# Patient Record
Sex: FEMALE | Race: OTHER | HISPANIC OR LATINO
[De-identification: names, ages, dates, MRNs, and addresses within clinical notes are randomized per-mention and may not be internally consistent; named-entity substitution may affect disease eponyms.]

---

## 2018-02-01 ENCOUNTER — TRANSCRIPTION ENCOUNTER (OUTPATIENT)
Age: 33
End: 2018-02-01

## 2018-05-08 ENCOUNTER — OUTPATIENT (OUTPATIENT)
Dept: OUTPATIENT SERVICES | Facility: HOSPITAL | Age: 33
LOS: 1 days | Discharge: HOME | End: 2018-05-08

## 2018-05-08 DIAGNOSIS — N89.8 OTHER SPECIFIED NONINFLAMMATORY DISORDERS OF VAGINA: ICD-10-CM

## 2018-05-28 ENCOUNTER — INPATIENT (INPATIENT)
Facility: HOSPITAL | Age: 33
LOS: 1 days | Discharge: HOME | End: 2018-05-30
Attending: OBSTETRICS & GYNECOLOGY | Admitting: OBSTETRICS & GYNECOLOGY

## 2018-05-28 VITALS — DIASTOLIC BLOOD PRESSURE: 77 MMHG | SYSTOLIC BLOOD PRESSURE: 139 MMHG | HEART RATE: 71 BPM

## 2018-05-28 LAB
AMPHET UR-MCNC: NEGATIVE — SIGNIFICANT CHANGE UP
APPEARANCE UR: (no result)
BACTERIA # UR AUTO: (no result) /HPF
BARBITURATES UR SCN-MCNC: NEGATIVE — SIGNIFICANT CHANGE UP
BASOPHILS # BLD AUTO: 0.07 K/UL — SIGNIFICANT CHANGE UP (ref 0–0.2)
BASOPHILS NFR BLD AUTO: 0.4 % — SIGNIFICANT CHANGE UP (ref 0–1)
BENZODIAZ UR-MCNC: NEGATIVE — SIGNIFICANT CHANGE UP
BILIRUB UR-MCNC: NEGATIVE — SIGNIFICANT CHANGE UP
BLD GP AB SCN SERPL QL: SIGNIFICANT CHANGE UP
COCAINE METAB.OTHER UR-MCNC: NEGATIVE — SIGNIFICANT CHANGE UP
COLOR SPEC: SIGNIFICANT CHANGE UP
DIFF PNL FLD: (no result)
EOSINOPHIL # BLD AUTO: 0.01 K/UL — SIGNIFICANT CHANGE UP (ref 0–0.7)
EOSINOPHIL NFR BLD AUTO: 0.1 % — SIGNIFICANT CHANGE UP (ref 0–8)
EPI CELLS # UR: (no result) /HPF
GLUCOSE UR QL: NEGATIVE MG/DL — SIGNIFICANT CHANGE UP
GRAN CASTS # UR COMP ASSIST: (no result) /LPF
HCT VFR BLD CALC: 39.4 % — SIGNIFICANT CHANGE UP (ref 37–47)
HGB BLD-MCNC: 14 G/DL — SIGNIFICANT CHANGE UP (ref 12–16)
IMM GRANULOCYTES NFR BLD AUTO: 2 % — HIGH (ref 0.1–0.3)
KETONES UR-MCNC: NEGATIVE — SIGNIFICANT CHANGE UP
LEUKOCYTE ESTERASE UR-ACNC: (no result)
LYMPHOCYTES # BLD AUTO: 1.63 K/UL — SIGNIFICANT CHANGE UP (ref 1.2–3.4)
LYMPHOCYTES # BLD AUTO: 9.2 % — LOW (ref 20.5–51.1)
MCHC RBC-ENTMCNC: 31.2 PG — HIGH (ref 27–31)
MCHC RBC-ENTMCNC: 35.5 G/DL — SIGNIFICANT CHANGE UP (ref 32–37)
MCV RBC AUTO: 87.8 FL — SIGNIFICANT CHANGE UP (ref 81–99)
METHADONE UR-MCNC: NEGATIVE — SIGNIFICANT CHANGE UP
MONOCYTES # BLD AUTO: 0.77 K/UL — HIGH (ref 0.1–0.6)
MONOCYTES NFR BLD AUTO: 4.3 % — SIGNIFICANT CHANGE UP (ref 1.7–9.3)
NEUTROPHILS # BLD AUTO: 14.95 K/UL — HIGH (ref 1.4–6.5)
NEUTROPHILS NFR BLD AUTO: 84 % — HIGH (ref 42.2–75.2)
NITRITE UR-MCNC: NEGATIVE — SIGNIFICANT CHANGE UP
NRBC # BLD: 0 /100 WBCS — SIGNIFICANT CHANGE UP (ref 0–0)
OPIATES UR-MCNC: NEGATIVE — SIGNIFICANT CHANGE UP
PCP SPEC-MCNC: SIGNIFICANT CHANGE UP
PH UR: 6 — SIGNIFICANT CHANGE UP (ref 5–8)
PLATELET # BLD AUTO: 253 K/UL — SIGNIFICANT CHANGE UP (ref 130–400)
PRENATAL SYPHILIS TEST: SIGNIFICANT CHANGE UP
PROPOXYPHENE QUALITATIVE URINE RESULT: NEGATIVE — SIGNIFICANT CHANGE UP
PROT UR-MCNC: 100 MG/DL
RBC # BLD: 4.49 M/UL — SIGNIFICANT CHANGE UP (ref 4.2–5.4)
RBC # FLD: 13 % — SIGNIFICANT CHANGE UP (ref 11.5–14.5)
RBC CASTS # UR COMP ASSIST: (no result) /HPF
SP GR SPEC: 1.02 — SIGNIFICANT CHANGE UP (ref 1.01–1.03)
TYPE + AB SCN PNL BLD: SIGNIFICANT CHANGE UP
UROBILINOGEN FLD QL: 0.2 MG/DL — SIGNIFICANT CHANGE UP (ref 0.2–0.2)
WBC # BLD: 17.79 K/UL — HIGH (ref 4.8–10.8)
WBC # FLD AUTO: 17.79 K/UL — HIGH (ref 4.8–10.8)
WBC UR QL: (no result) /HPF

## 2018-05-28 RX ORDER — ONDANSETRON 8 MG/1
4 TABLET, FILM COATED ORAL EVERY 6 HOURS
Qty: 0 | Refills: 0 | Status: DISCONTINUED | OUTPATIENT
Start: 2018-05-28 | End: 2018-05-30

## 2018-05-28 RX ORDER — MAGNESIUM HYDROXIDE 400 MG/1
30 TABLET, CHEWABLE ORAL
Qty: 0 | Refills: 0 | Status: DISCONTINUED | OUTPATIENT
Start: 2018-05-28 | End: 2018-05-30

## 2018-05-28 RX ORDER — SODIUM CHLORIDE 9 MG/ML
1000 INJECTION, SOLUTION INTRAVENOUS ONCE
Qty: 0 | Refills: 0 | Status: DISCONTINUED | OUTPATIENT
Start: 2018-05-28 | End: 2018-05-29

## 2018-05-28 RX ORDER — OXYTOCIN 10 UNIT/ML
41.67 VIAL (ML) INJECTION
Qty: 20 | Refills: 0 | Status: DISCONTINUED | OUTPATIENT
Start: 2018-05-28 | End: 2018-05-30

## 2018-05-28 RX ORDER — ACETAMINOPHEN 500 MG
650 TABLET ORAL EVERY 6 HOURS
Qty: 0 | Refills: 0 | Status: DISCONTINUED | OUTPATIENT
Start: 2018-05-28 | End: 2018-05-30

## 2018-05-28 RX ORDER — GLYCERIN ADULT
1 SUPPOSITORY, RECTAL RECTAL AT BEDTIME
Qty: 0 | Refills: 0 | Status: DISCONTINUED | OUTPATIENT
Start: 2018-05-28 | End: 2018-05-30

## 2018-05-28 RX ORDER — DOCUSATE SODIUM 100 MG
100 CAPSULE ORAL
Qty: 0 | Refills: 0 | Status: DISCONTINUED | OUTPATIENT
Start: 2018-05-28 | End: 2018-05-30

## 2018-05-28 RX ORDER — DIBUCAINE 1 %
1 OINTMENT (GRAM) RECTAL EVERY 4 HOURS
Qty: 0 | Refills: 0 | Status: DISCONTINUED | OUTPATIENT
Start: 2018-05-28 | End: 2018-05-30

## 2018-05-28 RX ORDER — OXYCODONE AND ACETAMINOPHEN 5; 325 MG/1; MG/1
1 TABLET ORAL
Qty: 0 | Refills: 0 | Status: DISCONTINUED | OUTPATIENT
Start: 2018-05-28 | End: 2018-05-30

## 2018-05-28 RX ORDER — IBUPROFEN 200 MG
600 TABLET ORAL EVERY 6 HOURS
Qty: 0 | Refills: 0 | Status: DISCONTINUED | OUTPATIENT
Start: 2018-05-28 | End: 2018-05-30

## 2018-05-28 RX ORDER — AER TRAVELER 0.5 G/1
1 SOLUTION RECTAL; TOPICAL EVERY 4 HOURS
Qty: 0 | Refills: 0 | Status: DISCONTINUED | OUTPATIENT
Start: 2018-05-28 | End: 2018-05-30

## 2018-05-28 RX ORDER — TETANUS TOXOID, REDUCED DIPHTHERIA TOXOID AND ACELLULAR PERTUSSIS VACCINE, ADSORBED 5; 2.5; 8; 8; 2.5 [IU]/.5ML; [IU]/.5ML; UG/.5ML; UG/.5ML; UG/.5ML
0.5 SUSPENSION INTRAMUSCULAR ONCE
Qty: 0 | Refills: 0 | Status: DISCONTINUED | OUTPATIENT
Start: 2018-05-28 | End: 2018-05-30

## 2018-05-28 RX ORDER — NALOXONE HYDROCHLORIDE 4 MG/.1ML
0.1 SPRAY NASAL
Qty: 0 | Refills: 0 | Status: DISCONTINUED | OUTPATIENT
Start: 2018-05-28 | End: 2018-05-30

## 2018-05-28 RX ORDER — SODIUM CHLORIDE 9 MG/ML
1000 INJECTION, SOLUTION INTRAVENOUS
Qty: 0 | Refills: 0 | Status: DISCONTINUED | OUTPATIENT
Start: 2018-05-28 | End: 2018-05-29

## 2018-05-28 RX ORDER — LANOLIN
1 OINTMENT (GRAM) TOPICAL EVERY 6 HOURS
Qty: 0 | Refills: 0 | Status: DISCONTINUED | OUTPATIENT
Start: 2018-05-28 | End: 2018-05-30

## 2018-05-28 RX ORDER — SIMETHICONE 80 MG/1
80 TABLET, CHEWABLE ORAL EVERY 6 HOURS
Qty: 0 | Refills: 0 | Status: DISCONTINUED | OUTPATIENT
Start: 2018-05-28 | End: 2018-05-30

## 2018-05-28 RX ORDER — OXYTOCIN 10 UNIT/ML
125 VIAL (ML) INJECTION
Qty: 20 | Refills: 0 | Status: DISCONTINUED | OUTPATIENT
Start: 2018-05-28 | End: 2018-05-29

## 2018-05-28 RX ORDER — DIPHENHYDRAMINE HCL 50 MG
25 CAPSULE ORAL EVERY 6 HOURS
Qty: 0 | Refills: 0 | Status: DISCONTINUED | OUTPATIENT
Start: 2018-05-28 | End: 2018-05-30

## 2018-05-28 RX ADMIN — Medication 600 MILLIGRAM(S): at 23:33

## 2018-05-28 NOTE — OB PROVIDER H&P - HISTORY OF PRESENT ILLNESS
34yo  at 39w4d GA, by LMP c/w 1st trim sono, c/o ctx since 0500, currently q2-3min, 7/10 intensity. C/o LOF since early this morning, unsure of time, pinkish tinged, clear. Denies harlan VB. Good fetal movements. No complications during this pregnancy.

## 2018-05-28 NOTE — OB PROVIDER DELIVERY SUMMARY - NSPROVIDERDELIVERYNOTE_OBGYN_ALL_OB_FT
Pt became fully dilated and pushed well over intact perineum, delivery of head in direct OP position, nose and mouth bulb suctioned on perineum, can x 1 loose reduced, followed by delivery of shoulders and body without difficulty, live male infant, APGARS 9/9, 3150gms, 3-vessel cord + placenta complete, 2nd degree perineal laceration repaired with chromic, no complications, Viacord + collected

## 2018-05-28 NOTE — PROCEDURE NOTE - GENERAL PROCEDURE DETAILS
Epidural catheter placed easily and aseptically via RE technique, NO CSF, heme, or paresthesiae noted.  Catheter secured, sterile dressing applied.  Pt. placed supine with L.U.D.

## 2018-05-28 NOTE — OB PROVIDER H&P - NSHPPHYSICALEXAM_GEN_ALL_CORE
Vital Signs Last 24 Hrs  T(C): 36.8 (28 May 2018 09:26), Max: 37.1 (28 May 2018 09:08)  T(F): 98.2 (28 May 2018 09:26), Max: 98.7 (28 May 2018 09:08)  HR: 70 (28 May 2018 09:26) (70 - 71)  BP: 105/66 (28 May 2018 09:26) (105/66 - 139/77)  RR: 18 (28 May 2018 09:26) (18 - 20)   EFM: 130/mod/accels+  [place]: q1-3min   SVE: 5-6/80/-2, vtx, forewaters present   Speculum: pooling pos, nitrazine pos, ferning pos

## 2018-05-28 NOTE — OB PROVIDER H&P - NSNYCREQUIREMENTS_OBGYN_ALL_OB
ADD [facility] REQUIREMENTS SECTION (Counts include 234 beds at the [facility])...

## 2018-05-28 NOTE — OB PROVIDER H&P - ASSESSMENT
32yo  at 39w4d GA, by LMP c/w 1st trim sono, GBS neg, in labor  - Admit  - Clear liquids  - IV fluids  - Admission labs  - Cont toco and efm  - Pain mgt prn      Dr. Melo and Dr. Cooper aware

## 2018-05-28 NOTE — OB PROVIDER H&P - NSHPLABSRESULTS_GEN_ALL_CORE
ega- 29.5w- efw 1342g (43%) cephalic, 3VC, anterior placenta, MVP 5.5cm   ega- 19.4w- efw 298g, transverse placenta, 3vc, anterior placenta, normal anatomy  GCT 97

## 2018-05-28 NOTE — PROGRESS NOTE ADULT - SUBJECTIVE AND OBJECTIVE BOX
PGY2 Note    Patient seen at bedside for evaluation of labor progression, doing well, no complaints.     Vital Signs Last 24 Hrs  T(C): 37.0 (28 May 2018 15:30), Max: 37.1 (28 May 2018 09:08)  T(F): 98.6 (28 May 2018 15:30), Max: 98.7 (28 May 2018 09:08)  HR: 65 (28 May 2018 16:37) (55 - 103)  BP: 108/57 (28 May 2018 16:37) (80/47 - 139/77)  BP(mean): --  RR: 18 (28 May 2018 16:06) (18 - 20)  SpO2: --  EFM: 135/mod/+accels, one variable deceleration spontaneous with return to baseline. ISE in place since 1206.  TOCO: Q4-5min  SVE: Deferred, last VE 10100/0 at 1549  AROM clear at 1150    Medications:  1006 Epidural given  1113 Ephedrine    Labs:                        14.0   17.79 )-----------( 253      ( 28 May 2018 10:01 )             39.4         Urinalysis Basic - ( 28 May 2018 15:25 )    Color: Dark Yellow / Appearance: Cloudy / S.025 / pH: x  Gluc: x / Ketone: Negative  / Bili: Negative / Urobili: 0.2 mg/dL   Blood: x / Protein: 100 mg/dL / Nitrite: Negative   Leuk Esterase: Large / RBC: 25-50 /HPF / WBC 26-50 /HPF   Sq Epi: x / Non Sq Epi: Few /HPF / Bacteria: Few /HPF      Prenatal Syphilis Test: Nonreact (18 @ 10:01)    Type + Screen (18 @ 09:58)    Type + Screen: A POS    UDS pending

## 2018-05-28 NOTE — PROGRESS NOTE ADULT - SUBJECTIVE AND OBJECTIVE BOX
PGY3 Antepartum Note    Patient seen and examined at bedside in NAD. Denies any complaints at this time, reports pain well controlled with epidural.     T(F): 98.2 (09:26)  HR: 77 (14:07)  BP: 91/53 (14:07)  RR: 18 (12:36)  EFM: 135, mod.clara, + accels  TOCO: q 5-6 min  SVE: deferred, last exam 9100/0    Medications:  s/p epidural  IVF      Labs:                        14.0   17.79 )-----------( 253      ( 28 May 2018 10:01 )             39.4         A/P:   34yo  at 39wk5d GA, GBS negative, in labor  - c/w current management  - continuous toco/EFM  - pain management PRN    Dr. Cooper aware

## 2018-05-28 NOTE — PROGRESS NOTE ADULT - ASSESSMENT
A/P:   33y  at 39w5d, GBS negative, in labor with ISE in place, s/p epidural  -pain management prn  -cont efm/toco  -f/u UDS  -cont to monitor vitals  -cont iv hydration  - Anticipate     Dr. Melo and Dr. Cooper aware

## 2018-05-29 LAB
BASOPHILS # BLD AUTO: 0.06 K/UL — SIGNIFICANT CHANGE UP (ref 0–0.2)
BASOPHILS NFR BLD AUTO: 0.3 % — SIGNIFICANT CHANGE UP (ref 0–1)
EOSINOPHIL # BLD AUTO: 0.11 K/UL — SIGNIFICANT CHANGE UP (ref 0–0.7)
EOSINOPHIL NFR BLD AUTO: 0.6 % — SIGNIFICANT CHANGE UP (ref 0–8)
HCT VFR BLD CALC: 32.6 % — LOW (ref 37–47)
HGB BLD-MCNC: 11.1 G/DL — LOW (ref 12–16)
IMM GRANULOCYTES NFR BLD AUTO: 2.3 % — HIGH (ref 0.1–0.3)
LYMPHOCYTES # BLD AUTO: 14.8 % — LOW (ref 20.5–51.1)
LYMPHOCYTES # BLD AUTO: 2.66 K/UL — SIGNIFICANT CHANGE UP (ref 1.2–3.4)
MCHC RBC-ENTMCNC: 31 PG — SIGNIFICANT CHANGE UP (ref 27–31)
MCHC RBC-ENTMCNC: 34 G/DL — SIGNIFICANT CHANGE UP (ref 32–37)
MCV RBC AUTO: 91.1 FL — SIGNIFICANT CHANGE UP (ref 81–99)
MONOCYTES # BLD AUTO: 1.32 K/UL — HIGH (ref 0.1–0.6)
MONOCYTES NFR BLD AUTO: 7.4 % — SIGNIFICANT CHANGE UP (ref 1.7–9.3)
NEUTROPHILS # BLD AUTO: 13.36 K/UL — HIGH (ref 1.4–6.5)
NEUTROPHILS NFR BLD AUTO: 74.6 % — SIGNIFICANT CHANGE UP (ref 42.2–75.2)
NRBC # BLD: 0 /100 WBCS — SIGNIFICANT CHANGE UP (ref 0–0)
PLATELET # BLD AUTO: 238 K/UL — SIGNIFICANT CHANGE UP (ref 130–400)
RBC # BLD: 3.58 M/UL — LOW (ref 4.2–5.4)
RBC # FLD: 13.5 % — SIGNIFICANT CHANGE UP (ref 11.5–14.5)
WBC # BLD: 17.93 K/UL — HIGH (ref 4.8–10.8)
WBC # FLD AUTO: 17.93 K/UL — HIGH (ref 4.8–10.8)

## 2018-05-29 RX ADMIN — Medication 600 MILLIGRAM(S): at 00:16

## 2018-05-29 RX ADMIN — Medication 600 MILLIGRAM(S): at 06:48

## 2018-05-29 RX ADMIN — Medication 1 TABLET(S): at 09:01

## 2018-05-29 RX ADMIN — Medication 600 MILLIGRAM(S): at 20:40

## 2018-05-29 RX ADMIN — Medication 600 MILLIGRAM(S): at 21:45

## 2018-05-29 RX ADMIN — Medication 600 MILLIGRAM(S): at 07:18

## 2018-05-29 NOTE — PROGRESS NOTE ADULT - ASSESSMENT
IMP: s/p  - PPD 1 - Doing Well    Plan:  f/u CBC, NSAID's/PNV's, Continued PP Care, anticipated d/c -

## 2018-05-29 NOTE — PROGRESS NOTE ADULT - SUBJECTIVE AND OBJECTIVE BOX
Pt seen at bedside, no complaints, nursing    Vital Signs Last 24 Hrs  T(C): 35.8 (29 May 2018 07:50), Max: 37.1 (28 May 2018 09:08)  T(F): 96.5 (29 May 2018 07:50), Max: 98.7 (28 May 2018 09:08)  HR: 70 (29 May 2018 07:50) (55 - 130)  BP: 109/58 (29 May 2018 07:50) (63/26 - 162/59)  RR: 18 (29 May 2018 07:50) (18 - 20)    Resp - CTA b/l  CVS - S1S2+, RRR  Breasts - soft, NT  Abd - soft, NTND, BS+, uterus - firm  VE - Moderate lochia, perineum- intact  Ext - No Homans    LABS:                          14.0   17.79 )-----------( 253      ( 28 May 2018 10:01 )             39.4     A POS, Rubella - Immune, RPR - NR

## 2018-05-30 ENCOUNTER — TRANSCRIPTION ENCOUNTER (OUTPATIENT)
Age: 33
End: 2018-05-30

## 2018-05-30 VITALS
RESPIRATION RATE: 20 BRPM | TEMPERATURE: 97 F | HEART RATE: 68 BPM | SYSTOLIC BLOOD PRESSURE: 130 MMHG | DIASTOLIC BLOOD PRESSURE: 66 MMHG

## 2018-05-30 RX ORDER — ACETAMINOPHEN 500 MG
2 TABLET ORAL
Qty: 0 | Refills: 0 | DISCHARGE
Start: 2018-05-30

## 2018-05-30 RX ORDER — IBUPROFEN 200 MG
1 TABLET ORAL
Qty: 0 | Refills: 0 | DISCHARGE
Start: 2018-05-30

## 2018-05-30 NOTE — DISCHARGE NOTE OB - CARE PROVIDER_API CALL
Kevan Cooper), Obstetrics and Gynecology  [address] [place] [address]  Phone: [phone]  Fax: [phone]

## 2018-05-30 NOTE — DISCHARGE NOTE OB - CARE PLAN
Goal:	Healthy mom and baby  Assessment and plan of treatment:	No heavy lifting. Nothing inside the vagina for 6 weeks: no tampons, douching, sexual intercourse, tub baths or pools. If you have a fever over 100.4F, severe abdominal pain or heavy vaginal bleeding please call your doctor or go to the emergency room.

## 2018-05-30 NOTE — DISCHARGE NOTE OB - PLAN OF CARE
Healthy mom and baby No heavy lifting. Nothing inside the vagina for 6 weeks: no tampons, douching, sexual intercourse, tub baths or pools. If you have a fever over 100.4F, severe abdominal pain or heavy vaginal bleeding please call your doctor or go to the emergency room.

## 2018-05-30 NOTE — DISCHARGE NOTE OB - PATIENT PORTAL LINK FT
You can access the [facility] Patient Portal, offered by [facility], by registering with the following website: [email or web address]

## 2018-05-30 NOTE — DISCHARGE NOTE OB - MEDICATION SUMMARY - MEDICATIONS TO TAKE
I will START or STAY ON the medications listed below when I get home from the hospital:    Actamin 325 mg oral tablet  -- 2 tab(s) by mouth every 6 hours, As needed, Mild Pain  -- Indication: For Vaginal Delivery    ibuprofen 600 mg oral tablet  -- 1 tab(s) by mouth every 6 hours, As needed, Moderate Pain  -- Indication: For Vaginal Delivery

## 2018-05-30 NOTE — DISCHARGE NOTE OB - HOSPITAL COURSE
Patient present for labor pain. Uncomplicated delivery and postpartum stay.  Stable to be discharged to home.

## 2018-05-30 NOTE — PROGRESS NOTE ADULT - SUBJECTIVE AND OBJECTIVE BOX
Pt seen at bedside, no complaints, nursing    Vital Signs Last 24 Hrs  T(C): 35.9 (30 May 2018 07:45), Max: 36.4 (29 May 2018 15:10)  T(F): 96.6 (30 May 2018 07:45), Max: 97.6 (29 May 2018 15:10)  HR: 68 (30 May 2018 07:45) (68 - 77)  BP: 130/66 (30 May 2018 07:45) (105/55 - 130/66)  RR: 20 (30 May 2018 07:45) (18 - 20)    Resp - CTA b/l  CVS - S1S2+, RRR  Breasts - soft, NT  Abd - soft, NTND, BS+, uterus - firm  VE - Minimal lochia, perineum- intact  Ext - No Homans                          11.1   17.93 )-----------( 238      ( 29 May 2018 19:07 )             32.6

## 2018-06-01 DIAGNOSIS — Z3A.39 39 WEEKS GESTATION OF PREGNANCY: ICD-10-CM

## 2018-06-01 DIAGNOSIS — Z33.1 PREGNANT STATE, INCIDENTAL: ICD-10-CM

## 2019-02-08 ENCOUNTER — OUTPATIENT (OUTPATIENT)
Dept: OUTPATIENT SERVICES | Facility: HOSPITAL | Age: 34
LOS: 1 days | Discharge: HOME | End: 2019-02-08

## 2019-02-09 DIAGNOSIS — D53.9 NUTRITIONAL ANEMIA, UNSPECIFIED: ICD-10-CM

## 2019-02-09 DIAGNOSIS — E03.9 HYPOTHYROIDISM, UNSPECIFIED: ICD-10-CM

## 2019-02-09 DIAGNOSIS — Z00.00 ENCOUNTER FOR GENERAL ADULT MEDICAL EXAMINATION WITHOUT ABNORMAL FINDINGS: ICD-10-CM

## 2019-02-09 DIAGNOSIS — N39.0 URINARY TRACT INFECTION, SITE NOT SPECIFIED: ICD-10-CM

## 2019-02-09 DIAGNOSIS — E55.9 VITAMIN D DEFICIENCY, UNSPECIFIED: ICD-10-CM

## 2019-02-09 DIAGNOSIS — Z13.1 ENCOUNTER FOR SCREENING FOR DIABETES MELLITUS: ICD-10-CM

## 2019-02-09 DIAGNOSIS — E78.5 HYPERLIPIDEMIA, UNSPECIFIED: ICD-10-CM

## 2019-02-09 DIAGNOSIS — D51.8 OTHER VITAMIN B12 DEFICIENCY ANEMIAS: ICD-10-CM

## 2019-09-26 ENCOUNTER — OUTPATIENT (OUTPATIENT)
Dept: OUTPATIENT SERVICES | Facility: HOSPITAL | Age: 34
LOS: 1 days | Discharge: HOME | End: 2019-09-26

## 2019-09-28 DIAGNOSIS — N89.8 OTHER SPECIFIED NONINFLAMMATORY DISORDERS OF VAGINA: ICD-10-CM

## 2019-10-17 ENCOUNTER — INPATIENT (INPATIENT)
Facility: HOSPITAL | Age: 34
LOS: 2 days | Discharge: HOME | End: 2019-10-20
Attending: OBSTETRICS & GYNECOLOGY | Admitting: OBSTETRICS & GYNECOLOGY

## 2019-10-17 VITALS — HEART RATE: 87 BPM | SYSTOLIC BLOOD PRESSURE: 111 MMHG | DIASTOLIC BLOOD PRESSURE: 67 MMHG

## 2019-10-17 RX ORDER — AMPICILLIN TRIHYDRATE 250 MG
2 CAPSULE ORAL ONCE
Refills: 0 | Status: COMPLETED | OUTPATIENT
Start: 2019-10-17 | End: 2019-10-17

## 2019-10-17 RX ORDER — OXYTOCIN 10 UNIT/ML
333.33 VIAL (ML) INJECTION
Qty: 20 | Refills: 0 | Status: DISCONTINUED | OUTPATIENT
Start: 2019-10-17 | End: 2019-10-20

## 2019-10-17 RX ORDER — AMPICILLIN TRIHYDRATE 250 MG
1 CAPSULE ORAL EVERY 4 HOURS
Refills: 0 | Status: DISCONTINUED | OUTPATIENT
Start: 2019-10-17 | End: 2019-10-18

## 2019-10-17 RX ORDER — CITRIC ACID/SODIUM CITRATE 300-500 MG
15 SOLUTION, ORAL ORAL EVERY 6 HOURS
Refills: 0 | Status: DISCONTINUED | OUTPATIENT
Start: 2019-10-17 | End: 2019-10-18

## 2019-10-17 RX ORDER — INFLUENZA VIRUS VACCINE 15; 15; 15; 15 UG/.5ML; UG/.5ML; UG/.5ML; UG/.5ML
0.5 SUSPENSION INTRAMUSCULAR ONCE
Refills: 0 | Status: COMPLETED | OUTPATIENT
Start: 2019-10-17 | End: 2019-10-19

## 2019-10-17 RX ORDER — SODIUM CHLORIDE 9 MG/ML
1000 INJECTION, SOLUTION INTRAVENOUS
Refills: 0 | Status: DISCONTINUED | OUTPATIENT
Start: 2019-10-17 | End: 2019-10-18

## 2019-10-17 RX ADMIN — Medication 216 GRAM(S): at 22:52

## 2019-10-17 NOTE — OB PROVIDER H&P - NS_OBGYNHISTORY_OBGYN_ALL_OB_FT
OB Hx   FT x1, 2su41ul, no complications    GYN Hx  Denies ovarian cysts, fibroids, STIs, abnormal papsmears.

## 2019-10-17 NOTE — OB PROVIDER H&P - ATTENDING COMMENTS
IMP: IUP @ 38.6wks, GBS+, In Labor    Plan: Admit, IV - Abx, Pain Management, Pitocin Induction, Anticipated

## 2019-10-17 NOTE — OB PROVIDER H&P - HISTORY OF PRESENT ILLNESS
33yo  @38w6d, ELIZABETH 10/25/19 by first trimester sonogram at 6 weeks, presents to L&D with contractions that started.... Denies VB, LOF. Reports good FM. Patient has received prenatal care from the beginning of gestation and denies any complications. 35yo  @38w6d, ELIZABETH 10/25/19 by first trimester sonogram at 6 weeks, presents to L&D with contractions that started around 1400, every 5-10 minutes, 8/10 intensity. They were felt every 3-4 minutes two hours later, same intensity. She felt some LOF since the 0800 today, when she arrived in the hospital @2200 she felt a small gush of clear fluid. Denies VB. Reports good FM. Patient has received prenatal care from the beginning of gestation and denies any complications.

## 2019-10-17 NOTE — OB PROVIDER H&P - NSHPPHYSICALEXAM_GEN_ALL_CORE
Vital Signs Last 24 Hrs  T(C): 36.8 (17 Oct 2019 22:57), Max: 36.8 (17 Oct 2019 22:57)  T(F): 98.24 (17 Oct 2019 22:57), Max: 98.24 (17 Oct 2019 22:57)  HR: 87 (17 Oct 2019 22:37) (87 - 87)  BP: 111/67 (17 Oct 2019 22:37) (111/67 - 111/67)  RR: 18 (17 Oct 2019 22:57) (18 - 18) Vital Signs Last 24 Hrs  T(C): 36.8 (17 Oct 2019 22:57), Max: 36.8 (17 Oct 2019 22:57)  T(F): 98.24 (17 Oct 2019 22:57), Max: 98.24 (17 Oct 2019 22:57)  HR: 87 (17 Oct 2019 22:37) (87 - 87)  BP: 111/67 (17 Oct 2019 22:37) (111/67 - 111/67)  RR: 18 (17 Oct 2019 22:57) (18 - 18)    Gen: AAOx3  Abd: soft, nontender, gravid, +palpable contractions  EFM: 150/mod clara/+accels  [place]: q3-4 mins  SVE: 4/70/-2, vtx by Dr. Browne  EFW by Leopold's: 7lb

## 2019-10-17 NOTE — OB PROVIDER H&P - ASSESSMENT
33yo  @38w6d, GBS pos, in early labor    -admit to L&D  -admission labs  -pain management PRN  -continuous EFM/toco  -clear liquids, IV hydration  -monitor vitals    Dr. Temple and Dr. Cooper to be made aware

## 2019-10-17 NOTE — OB PROVIDER H&P - NSHPLABSRESULTS_GEN_ALL_CORE
[place]  @29w4d EFW 1516g (57%), 3VC, cephalic, post placenta MVP 47mm, BPP 8/8  @19w4d EFW 356g, transverse, post placenta, normal fetal anatomy  @12w5d Normal ovaries, CRL 62.1, FHR+    7/16/19:  GCT 93mg/dL    6/12/19   Measles immune    5/16/19  Sequential screen neg

## 2019-10-18 LAB
AMPHET UR-MCNC: NEGATIVE — SIGNIFICANT CHANGE UP
APPEARANCE UR: ABNORMAL
BACTERIA # UR AUTO: ABNORMAL
BARBITURATES UR SCN-MCNC: NEGATIVE — SIGNIFICANT CHANGE UP
BASOPHILS # BLD AUTO: 0 K/UL — SIGNIFICANT CHANGE UP (ref 0–0.2)
BASOPHILS # BLD AUTO: 0.08 K/UL — SIGNIFICANT CHANGE UP (ref 0–0.2)
BASOPHILS NFR BLD AUTO: 0 % — SIGNIFICANT CHANGE UP (ref 0–1)
BASOPHILS NFR BLD AUTO: 0.6 % — SIGNIFICANT CHANGE UP (ref 0–1)
BENZODIAZ UR-MCNC: NEGATIVE — SIGNIFICANT CHANGE UP
BILIRUB UR-MCNC: NEGATIVE — SIGNIFICANT CHANGE UP
BLD GP AB SCN SERPL QL: SIGNIFICANT CHANGE UP
BUPRENORPHINE SCREEN, URINE RESULT: NEGATIVE — SIGNIFICANT CHANGE UP
COCAINE METAB.OTHER UR-MCNC: NEGATIVE — SIGNIFICANT CHANGE UP
COLOR SPEC: SIGNIFICANT CHANGE UP
DIFF PNL FLD: ABNORMAL
EOSINOPHIL # BLD AUTO: 0.26 K/UL — SIGNIFICANT CHANGE UP (ref 0–0.7)
EOSINOPHIL # BLD AUTO: 0.28 K/UL — SIGNIFICANT CHANGE UP (ref 0–0.7)
EOSINOPHIL NFR BLD AUTO: 1.8 % — SIGNIFICANT CHANGE UP (ref 0–8)
EOSINOPHIL NFR BLD AUTO: 1.9 % — SIGNIFICANT CHANGE UP (ref 0–8)
EPI CELLS # UR: 2 /HPF — SIGNIFICANT CHANGE UP (ref 0–5)
FENTANYL UR QL: NEGATIVE — SIGNIFICANT CHANGE UP
GIANT PLATELETS BLD QL SMEAR: PRESENT — SIGNIFICANT CHANGE UP
GLUCOSE UR QL: NEGATIVE — SIGNIFICANT CHANGE UP
HCT VFR BLD CALC: 32.7 % — LOW (ref 37–47)
HCT VFR BLD CALC: 38.8 % — SIGNIFICANT CHANGE UP (ref 37–47)
HGB BLD-MCNC: 10.7 G/DL — LOW (ref 12–16)
HGB BLD-MCNC: 13 G/DL — SIGNIFICANT CHANGE UP (ref 12–16)
HYALINE CASTS # UR AUTO: 4 /LPF — SIGNIFICANT CHANGE UP (ref 0–7)
IMM GRANULOCYTES NFR BLD AUTO: 3 % — HIGH (ref 0.1–0.3)
KETONES UR-MCNC: NEGATIVE — SIGNIFICANT CHANGE UP
L&D DRUG SCREEN, URINE: SIGNIFICANT CHANGE UP
LEUKOCYTE ESTERASE UR-ACNC: ABNORMAL
LYMPHOCYTES # BLD AUTO: 1.93 K/UL — SIGNIFICANT CHANGE UP (ref 1.2–3.4)
LYMPHOCYTES # BLD AUTO: 12.6 % — LOW (ref 20.5–51.1)
LYMPHOCYTES # BLD AUTO: 18 % — LOW (ref 20.5–51.1)
LYMPHOCYTES # BLD AUTO: 2.49 K/UL — SIGNIFICANT CHANGE UP (ref 1.2–3.4)
MANUAL SMEAR VERIFICATION: SIGNIFICANT CHANGE UP
MCHC RBC-ENTMCNC: 30.7 PG — SIGNIFICANT CHANGE UP (ref 27–31)
MCHC RBC-ENTMCNC: 30.8 PG — SIGNIFICANT CHANGE UP (ref 27–31)
MCHC RBC-ENTMCNC: 32.7 G/DL — SIGNIFICANT CHANGE UP (ref 32–37)
MCHC RBC-ENTMCNC: 33.5 G/DL — SIGNIFICANT CHANGE UP (ref 32–37)
MCV RBC AUTO: 91.7 FL — SIGNIFICANT CHANGE UP (ref 81–99)
MCV RBC AUTO: 94.2 FL — SIGNIFICANT CHANGE UP (ref 81–99)
METHADONE UR-MCNC: NEGATIVE — SIGNIFICANT CHANGE UP
MONOCYTES # BLD AUTO: 0.99 K/UL — HIGH (ref 0.1–0.6)
MONOCYTES # BLD AUTO: 1.38 K/UL — HIGH (ref 0.1–0.6)
MONOCYTES NFR BLD AUTO: 7.2 % — SIGNIFICANT CHANGE UP (ref 1.7–9.3)
MONOCYTES NFR BLD AUTO: 9 % — SIGNIFICANT CHANGE UP (ref 1.7–9.3)
MYELOCYTES NFR BLD: 0.9 % — HIGH (ref 0–0)
NEUTROPHILS # BLD AUTO: 11.31 K/UL — HIGH (ref 1.4–6.5)
NEUTROPHILS # BLD AUTO: 9.57 K/UL — HIGH (ref 1.4–6.5)
NEUTROPHILS NFR BLD AUTO: 69.3 % — SIGNIFICANT CHANGE UP (ref 42.2–75.2)
NEUTROPHILS NFR BLD AUTO: 73.9 % — SIGNIFICANT CHANGE UP (ref 42.2–75.2)
NITRITE UR-MCNC: NEGATIVE — SIGNIFICANT CHANGE UP
NRBC # BLD: 0 /100 WBCS — SIGNIFICANT CHANGE UP (ref 0–0)
OPIATES UR-MCNC: NEGATIVE — SIGNIFICANT CHANGE UP
OXYCODONE UR-MCNC: NEGATIVE — SIGNIFICANT CHANGE UP
PCP UR-MCNC: NEGATIVE — SIGNIFICANT CHANGE UP
PH UR: 6.5 — SIGNIFICANT CHANGE UP (ref 5–8)
PLAT MORPH BLD: NORMAL — SIGNIFICANT CHANGE UP
PLATELET # BLD AUTO: 223 K/UL — SIGNIFICANT CHANGE UP (ref 130–400)
PLATELET # BLD AUTO: 241 K/UL — SIGNIFICANT CHANGE UP (ref 130–400)
POLYCHROMASIA BLD QL SMEAR: SLIGHT — SIGNIFICANT CHANGE UP
PRENATAL SYPHILIS TEST: SIGNIFICANT CHANGE UP
PROPOXYPHENE QUALITATIVE URINE RESULT: NEGATIVE — SIGNIFICANT CHANGE UP
PROT UR-MCNC: SIGNIFICANT CHANGE UP
RBC # BLD: 3.47 M/UL — LOW (ref 4.2–5.4)
RBC # BLD: 4.23 M/UL — SIGNIFICANT CHANGE UP (ref 4.2–5.4)
RBC # FLD: 13.2 % — SIGNIFICANT CHANGE UP (ref 11.5–14.5)
RBC # FLD: 13.4 % — SIGNIFICANT CHANGE UP (ref 11.5–14.5)
RBC BLD AUTO: NORMAL — SIGNIFICANT CHANGE UP
RBC CASTS # UR COMP ASSIST: 22 /HPF — HIGH (ref 0–4)
SP GR SPEC: 1.01 — SIGNIFICANT CHANGE UP (ref 1.01–1.02)
TOXIC GRANULES BLD QL SMEAR: PRESENT — SIGNIFICANT CHANGE UP
UROBILINOGEN FLD QL: SIGNIFICANT CHANGE UP
VARIANT LYMPHS # BLD: 1.8 % — SIGNIFICANT CHANGE UP (ref 0–5)
WBC # BLD: 13.8 K/UL — HIGH (ref 4.8–10.8)
WBC # BLD: 15.31 K/UL — HIGH (ref 4.8–10.8)
WBC # FLD AUTO: 13.8 K/UL — HIGH (ref 4.8–10.8)
WBC # FLD AUTO: 15.31 K/UL — HIGH (ref 4.8–10.8)
WBC UR QL: 153 /HPF — HIGH (ref 0–5)

## 2019-10-18 RX ORDER — SIMETHICONE 80 MG/1
80 TABLET, CHEWABLE ORAL EVERY 4 HOURS
Refills: 0 | Status: DISCONTINUED | OUTPATIENT
Start: 2019-10-18 | End: 2019-10-20

## 2019-10-18 RX ORDER — LANOLIN
1 OINTMENT (GRAM) TOPICAL EVERY 6 HOURS
Refills: 0 | Status: DISCONTINUED | OUTPATIENT
Start: 2019-10-18 | End: 2019-10-20

## 2019-10-18 RX ORDER — IBUPROFEN 200 MG
600 TABLET ORAL EVERY 6 HOURS
Refills: 0 | Status: COMPLETED | OUTPATIENT
Start: 2019-10-18 | End: 2020-09-15

## 2019-10-18 RX ORDER — IBUPROFEN 200 MG
600 TABLET ORAL EVERY 6 HOURS
Refills: 0 | Status: DISCONTINUED | OUTPATIENT
Start: 2019-10-18 | End: 2019-10-20

## 2019-10-18 RX ORDER — NALOXONE HYDROCHLORIDE 4 MG/.1ML
0.1 SPRAY NASAL
Refills: 0 | Status: DISCONTINUED | OUTPATIENT
Start: 2019-10-18 | End: 2019-10-20

## 2019-10-18 RX ORDER — DEXAMETHASONE 0.5 MG/5ML
4 ELIXIR ORAL EVERY 6 HOURS
Refills: 0 | Status: DISCONTINUED | OUTPATIENT
Start: 2019-10-18 | End: 2019-10-20

## 2019-10-18 RX ORDER — MAGNESIUM HYDROXIDE 400 MG/1
30 TABLET, CHEWABLE ORAL
Refills: 0 | Status: DISCONTINUED | OUTPATIENT
Start: 2019-10-18 | End: 2019-10-20

## 2019-10-18 RX ORDER — OXYCODONE HYDROCHLORIDE 5 MG/1
5 TABLET ORAL
Refills: 0 | Status: DISCONTINUED | OUTPATIENT
Start: 2019-10-18 | End: 2019-10-20

## 2019-10-18 RX ORDER — DIBUCAINE 1 %
1 OINTMENT (GRAM) RECTAL EVERY 6 HOURS
Refills: 0 | Status: DISCONTINUED | OUTPATIENT
Start: 2019-10-18 | End: 2019-10-20

## 2019-10-18 RX ORDER — ONDANSETRON 8 MG/1
4 TABLET, FILM COATED ORAL EVERY 6 HOURS
Refills: 0 | Status: DISCONTINUED | OUTPATIENT
Start: 2019-10-18 | End: 2019-10-20

## 2019-10-18 RX ORDER — BENZOCAINE 10 %
1 GEL (GRAM) MUCOUS MEMBRANE EVERY 6 HOURS
Refills: 0 | Status: DISCONTINUED | OUTPATIENT
Start: 2019-10-18 | End: 2019-10-20

## 2019-10-18 RX ORDER — AER TRAVELER 0.5 G/1
1 SOLUTION RECTAL; TOPICAL EVERY 4 HOURS
Refills: 0 | Status: DISCONTINUED | OUTPATIENT
Start: 2019-10-18 | End: 2019-10-20

## 2019-10-18 RX ORDER — GLYCERIN ADULT
1 SUPPOSITORY, RECTAL RECTAL AT BEDTIME
Refills: 0 | Status: DISCONTINUED | OUTPATIENT
Start: 2019-10-18 | End: 2019-10-20

## 2019-10-18 RX ORDER — FENTANYL/BUPIVACAINE/NS/PF 2MCG/ML-.1
250 PLASTIC BAG, INJECTION (ML) INJECTION
Refills: 0 | Status: DISCONTINUED | OUTPATIENT
Start: 2019-10-18 | End: 2019-10-20

## 2019-10-18 RX ORDER — HYDROCORTISONE 1 %
1 OINTMENT (GRAM) TOPICAL EVERY 6 HOURS
Refills: 0 | Status: DISCONTINUED | OUTPATIENT
Start: 2019-10-18 | End: 2019-10-20

## 2019-10-18 RX ORDER — KETOROLAC TROMETHAMINE 30 MG/ML
30 SYRINGE (ML) INJECTION ONCE
Refills: 0 | Status: DISCONTINUED | OUTPATIENT
Start: 2019-10-18 | End: 2019-10-18

## 2019-10-18 RX ORDER — OXYTOCIN 10 UNIT/ML
333.33 VIAL (ML) INJECTION
Qty: 20 | Refills: 0 | Status: DISCONTINUED | OUTPATIENT
Start: 2019-10-18 | End: 2019-10-20

## 2019-10-18 RX ORDER — DOCUSATE SODIUM 100 MG
100 CAPSULE ORAL
Refills: 0 | Status: DISCONTINUED | OUTPATIENT
Start: 2019-10-18 | End: 2019-10-20

## 2019-10-18 RX ORDER — PRAMOXINE HYDROCHLORIDE 150 MG/15G
1 AEROSOL, FOAM RECTAL EVERY 4 HOURS
Refills: 0 | Status: DISCONTINUED | OUTPATIENT
Start: 2019-10-18 | End: 2019-10-20

## 2019-10-18 RX ORDER — DIPHENHYDRAMINE HCL 50 MG
25 CAPSULE ORAL EVERY 6 HOURS
Refills: 0 | Status: DISCONTINUED | OUTPATIENT
Start: 2019-10-18 | End: 2019-10-20

## 2019-10-18 RX ORDER — OXYCODONE HYDROCHLORIDE 5 MG/1
5 TABLET ORAL ONCE
Refills: 0 | Status: DISCONTINUED | OUTPATIENT
Start: 2019-10-18 | End: 2019-10-20

## 2019-10-18 RX ORDER — OXYTOCIN 10 UNIT/ML
2 VIAL (ML) INJECTION
Qty: 30 | Refills: 0 | Status: DISCONTINUED | OUTPATIENT
Start: 2019-10-18 | End: 2019-10-20

## 2019-10-18 RX ORDER — ACETAMINOPHEN 500 MG
975 TABLET ORAL
Refills: 0 | Status: DISCONTINUED | OUTPATIENT
Start: 2019-10-18 | End: 2019-10-20

## 2019-10-18 RX ADMIN — Medication 975 MILLIGRAM(S): at 14:58

## 2019-10-18 RX ADMIN — Medication 600 MILLIGRAM(S): at 13:16

## 2019-10-18 RX ADMIN — Medication 600 MILLIGRAM(S): at 18:23

## 2019-10-18 RX ADMIN — Medication 975 MILLIGRAM(S): at 08:17

## 2019-10-18 RX ADMIN — Medication 30 MILLIGRAM(S): at 03:33

## 2019-10-18 RX ADMIN — Medication 975 MILLIGRAM(S): at 21:44

## 2019-10-18 RX ADMIN — Medication 108 GRAM(S): at 02:42

## 2019-10-18 RX ADMIN — Medication 2 MILLIUNIT(S)/MIN: at 02:22

## 2019-10-18 NOTE — OB PROVIDER DELIVERY SUMMARY - NSPROVIDERDELIVERYNOTE_OBGYN_ALL_OB_FT
Pt became fully dilated and pushed well over intact perineum, delivery of head in CYNTHIA position, can x 1 loose reduced, followed by delivery of shoulders and body without difficulty, live female infant, APGARs 9/9, 3-vessel cord + placenta complete, 2nd degree perineal laceration repaired with chromic, no complications, Viacord+ collected

## 2019-10-19 RX ADMIN — Medication 975 MILLIGRAM(S): at 14:17

## 2019-10-19 RX ADMIN — Medication 975 MILLIGRAM(S): at 20:36

## 2019-10-19 RX ADMIN — INFLUENZA VIRUS VACCINE 0.5 MILLILITER(S): 15; 15; 15; 15 SUSPENSION INTRAMUSCULAR at 02:33

## 2019-10-19 RX ADMIN — Medication 600 MILLIGRAM(S): at 23:55

## 2019-10-19 RX ADMIN — Medication 600 MILLIGRAM(S): at 00:00

## 2019-10-19 RX ADMIN — Medication 975 MILLIGRAM(S): at 02:35

## 2019-10-19 RX ADMIN — Medication 600 MILLIGRAM(S): at 17:44

## 2019-10-19 RX ADMIN — Medication 600 MILLIGRAM(S): at 06:07

## 2019-10-19 RX ADMIN — Medication 975 MILLIGRAM(S): at 09:25

## 2019-10-19 RX ADMIN — Medication 600 MILLIGRAM(S): at 11:27

## 2019-10-19 NOTE — PROGRESS NOTE ADULT - SUBJECTIVE AND OBJECTIVE BOX
Pt seen at bedside, no complaints, nursing    Vital Signs Last 24 Hrs  T(C): 35.7 (19 Oct 2019 07:44), Max: 37 (18 Oct 2019 16:04)  T(F): 96.3 (19 Oct 2019 07:44), Max: 98.6 (18 Oct 2019 16:04)  HR: 69 (19 Oct 2019 07:44) (68 - 75)  BP: 119/63 (19 Oct 2019 07:44) (107/53 - 119/63)  RR: 18 (19 Oct 2019 07:44) (18 - 20)    Resp - CTA b/l  CVS - S1S2+, RRR  Breasts - soft, NT  Abd - soft, NTND, BS+, uterus - firm  VE - Moderate lochia, perineum- intact  Ext - No Homans                          10.7   13.80 )-----------( 223      ( 18 Oct 2019 16:49 )             32.7                         13.0   15.31 )-----------( 241      ( 17 Oct 2019 23:40 )             38.8     A POS, Rubella - immune, [place]ola - Immune, RPR - NR

## 2019-10-20 ENCOUNTER — TRANSCRIPTION ENCOUNTER (OUTPATIENT)
Age: 34
End: 2019-10-20

## 2019-10-20 VITALS
TEMPERATURE: 98 F | HEART RATE: 70 BPM | SYSTOLIC BLOOD PRESSURE: 122 MMHG | RESPIRATION RATE: 18 BRPM | DIASTOLIC BLOOD PRESSURE: 67 MMHG

## 2019-10-20 RX ORDER — SIMETHICONE 80 MG/1
1 TABLET, CHEWABLE ORAL
Qty: 0 | Refills: 0 | DISCHARGE
Start: 2019-10-20

## 2019-10-20 RX ORDER — DOCUSATE SODIUM 100 MG
1 CAPSULE ORAL
Qty: 0 | Refills: 0 | DISCHARGE
Start: 2019-10-20

## 2019-10-20 RX ORDER — IBUPROFEN 200 MG
1 TABLET ORAL
Qty: 0 | Refills: 0 | DISCHARGE
Start: 2019-10-20

## 2019-10-20 RX ORDER — ACETAMINOPHEN 500 MG
3 TABLET ORAL
Qty: 0 | Refills: 0 | DISCHARGE
Start: 2019-10-20

## 2019-10-20 RX ADMIN — Medication 975 MILLIGRAM(S): at 08:26

## 2019-10-20 RX ADMIN — Medication 600 MILLIGRAM(S): at 06:43

## 2019-10-20 RX ADMIN — Medication 600 MILLIGRAM(S): at 11:37

## 2019-10-20 RX ADMIN — Medication 975 MILLIGRAM(S): at 03:01

## 2019-10-20 NOTE — DISCHARGE NOTE OB - CARE PROVIDER_API CALL
Kevan Cooper)  Obstetrics and Gynecology  [address] [place] [address]  Phone: [phone]  Fax: [phone]  Follow Up Time:

## 2019-10-20 NOTE — DISCHARGE NOTE OB - ADDITIONAL INSTRUCTIONS
Nothing in the vagina for 6 weeks (no sex, no tampons, no douching). Avoid tub baths, you may shower.  If you have a fever of 100.4F or greater, severe vaginal bleeding, or severe abdominal pain, call your Ob/Gyn or come to the emergency department immediately.  Please follow up with your provider in 6 weeks for postpartum visit.

## 2019-10-20 NOTE — DISCHARGE NOTE OB - PATIENT PORTAL LINK FT
You can access the FollowMyHealth Patient Portal offered by [facility] by registering at the following website: [email or web address]. By joining [employer]’s FollowMyHealth portal, you will also be able to view your health information using other applications (apps) compatible with our system.

## 2019-10-20 NOTE — PROGRESS NOTE ADULT - SUBJECTIVE AND OBJECTIVE BOX
Pt seen at bedside, no complaints, nursing    Vital Signs Last 24 Hrs  T(C): 36.9 (20 Oct 2019 07:54), Max: 36.9 (20 Oct 2019 07:54)  T(F): 98.4 (20 Oct 2019 07:54), Max: 98.4 (20 Oct 2019 07:54)  HR: 70 (20 Oct 2019 07:54) (62 - 76)  BP: 122/67 (20 Oct 2019 07:54) (113/59 - 122/67)  RR: 18 (20 Oct 2019 07:54) (18 - 18)    Resp - CTA b/l  CVS - S1S2+, RRR  Breasts - soft, NT  Abd - soft, NTND, BS+, uterus - firm  VE - Minimal lochia, perineum- intact  Ext - No Homans

## 2019-10-20 NOTE — DISCHARGE NOTE OB - MEDICATION SUMMARY - MEDICATIONS TO TAKE
I will START or STAY ON the medications listed below when I get home from the hospital:    acetaminophen 325 mg oral tablet  -- 3 tab(s) by mouth every 6 hours, As Needed  -- Indication: For pain    ibuprofen 600 mg oral tablet  -- 1 tab(s) by mouth every 6 hours, As Needed  -- Indication: For pain    docusate sodium 100 mg oral capsule  -- 1 cap(s) by mouth 2 times a day, As needed, For stool softening  -- Indication: For constipation    simethicone 80 mg oral tablet, chewable  -- 1 tab(s) by mouth every 4 hours, As needed, Gas  -- Indication: For gas

## 2019-10-23 DIAGNOSIS — Z3A.40 40 WEEKS GESTATION OF PREGNANCY: ICD-10-CM

## 2019-12-07 NOTE — PROCEDURE NOTE - NSRESISTLOSS_OBGYN_ALL_OB
Patient Education   Patient Education     Glucose (Blood)  Does this test have other names?  Blood sugar, self-monitoring of blood glucose (SMBG), fasting plasma glucose (FPG), random plasma glucose  What is this test?  A blood glucose test is a blood test that tells you if your level of glucose, or blood sugar, is within a healthy range. Fasting plasma glucose, or FPG, is a common test used to diagnose and monitor diabetes or prediabetes.   Why do I need this test?  You may need this test if you have symptoms of diabetes. These include increased thirst, unexplained weight loss, increased urination, tiredness, blurred vision, and sores that don't heal. Sometimes people with prediabetes or diabetes don't have any symptoms.  If you are overweight, obese, or have other risk factors for diabetes like high blood sugar, your healthcare provider may recommend this test. Other risk factors for diabetes include high blood pressure, high cholesterol, physical inactivity, and a family history of diabetes. The U.S. Preventive Services Task Force recommends that adults ages 40 to 70 who are obese or overweight have their blood glucose checked at least every 3 years as long as their results are normal. All adults should be tested for diabetes every 3 years beginning at age 45, no matter what their weight.   If you are pregnant, you will be screened for gestational diabetes between 24 and 28 weeks. If you have gestational diabetes, you will be checked more often during your pregnancy and again after your pregnancy.   What other tests might I have along with this test?  Other tests that are used to diagnose diabetes or monitor blood glucose include an A1C blood test. A variation on the blood glucose test that is also sometimes used is called an oral glucose tolerance test, or OGTT. Because heart health is so closely tied to diabetes, regular checks of blood pressure, cholesterol, and triglycerides are important, too.  What do my  test results mean?  Test results may vary depending on your age, gender, health history, the method used for the test, and other things. Your test results may not mean you have a problem. Ask your healthcare provider what your test results mean for you.   Target blood glucose ranges vary from person to person. If you have diabetes, the American Diabetes Association's target blood glucose reading for you if you're not pregnant is between 70 and 130 milligrams per deciliter (mg/dL) before a meal. After a meal, it should be less than 180 mg/dL. Levels that are lower or higher than these may be a sign of blood sugar control problems.  For the FPG test, a level of 99 or below is normal. A level of 100 to 125 means you may have prediabetes. A level of 126 or above means you may have diabetes and need to do the test again on a different day to be sure. If you have an abnormal blood glucose, your healthcare provider may recommend behavioral counseling to help you eat better and get more exercise.  How is this test done?  The test is done with a blood sample. A needle is used to draw blood from a vein in your arm or hand.   Does this test pose any risks?  Having a blood test with a needle carries some risks. These include bleeding, infection, bruising, and feeling lightheaded. When the needle pricks your arm or hand, you may feel a slight sting or pain. Afterward, the site may be sore.  What might affect my test results?  A number of factors, primarily diet, can affect blood glucose levels. Follow your healthcare provider's instructions about when to check your blood glucose and what to do before and after checking it.  How do I get ready for this test?  When your blood is drawn in an office, you typically need to fast for 8 hours before the test. This means you should eat nothing and drink only water. When monitoring your blood glucose levels at home, you will often be asked to check it at different times, including before  and after meals. Carefully follow your healthcare provider's instructions for checking blood glucose levels at home.   Be sure your healthcare provider knows about all medicines, herbs, vitamins, and supplements you are taking. This includes medicines that don't need a prescription and any illicit drugs you may use.   © 9203-7527 [employer]. [address] [place] [address], [place] [address]. All rights reserved. This information is not intended as a substitute for professional medical care. Always follow your healthcare professional's instructions.           Lipid Panel  Does this test have other names?  Lipid profile, lipoprotein profile  What is this test?  This group of tests measures the amount of cholesterol and other fats in your blood.  Cholesterol and triglycerides are lipids, or fats. These fats are important for cell health, but they can be harmful when they build up in the blood. Sometimes they can lead to clogged, inflamed arteries, a condition call atherosclerosis. This may keep your heart from working normally if the arteries of your heart muscle are affected.   This panel of tests helps predict your risk for heart disease and stroke.  A lipid panel measures these fats:  · Total cholesterol  · LDL (\"bad\") cholesterol  · HDL (\"good\") cholesterol  · Triglycerides, another type of fat that causes hardening of the arteries  Why do I need this test?  You may need this panel of tests if you have a family history of heart disease or stroke.  You may also have this test if your healthcare provider believes you're at risk for heart disease. These are risk factors:  · High blood pressure  · Diabetes or prediabetes  · Overweight or obesity  · Smoking  · Lack of exercise  · Diet of unhealthy foods  · Stress  · High total cholesterol  If you are already being treated for heart disease, you may have this test to see whether treatment is working.  What other tests might I have along with this test?  Your  healthcare provider may also order other tests to look at how well your heart is working. These tests may include:  · Electrocardiogram, or ECG, which tests your heart's electrical impulses to see if it is beating normally  · Stress test, in which you may have to exercise while being monitored by ECG  · Echocardiogram, which uses sound waves to make pictures of your heart  · Cardiac catheterization. For this test, a healthcare provider puts a tube into your blood vessels and injects dye. X-rays are then done to look for clogs in the arteries of the heart.  Your provider may also order tests for high blood pressure or blood sugar, or glucose.  What do my test results mean?  Test results may vary depending on your age, gender, health history, the method used for the test, and other things. Your test results may not mean you have a problem. Ask your healthcare provider what your test results mean for you.   Results are given in milligrams per deciliter (mg/dL). Here are the ranges for total cholesterol in adults:  · Normal: Less than 200 mg/dL  · Borderline high: 200 to 239 mg/dL  · High: At or above 240 mg/dL  These are the adult ranges for LDL cholesterol:  · Optimal: Less than 100 mg/dL (This is the goal for people with diabetes or heart disease.)  · Near optimal: 100 to 129 mg/dL  · Borderline high: 130 to 159 mg/dL  · High: 160 to 189 mg/dL  · Very high: 190 mg/dL and higher  The above numbers are general guidelines, because actual goals depend on the number of risk factors you have for heart disease.  Your HDL cholesterol levels should be above 40 mg/dL. This type of fat is actually good for you because it lowers your risk of heart disease. The higher the number, the lower your risk. Sixty mg/dL or above is considered the level to protect you against heart disease.  · High levels of triglycerides are linked with a higher heart disease risk. Here are the adult ranges:  · Normal: Less than 150 mg/dL  · Borderline  high: 150 to 199 mg/dL  · High: 200 to 499 mg/dL  · Very high: Above 500 mg/dL  Depending on your test results, your healthcare provider will decide whether you need lifestyle changes or medicines to lower your cholesterol.  Your results and targets will vary according to your age and health. If you have high blood pressure or diabetes, you're at higher risk of having heart disease. You may have to take medicine to get your cholesterol and triglyceride levels even lower.  How is this test done?  The test is done with a blood sample, which is drawn through a needle from a vein in your arm.  Does this test pose any risks?  Having a blood test with a needle carries some risks. These include bleeding, infection, bruising, and feeling lightheaded. When the needle pricks your arm or hand, you may feel a slight sting or pain. Afterward, the site may be sore.   What might affect my test results?  Being sick or under stress, and taking certain medicines can affect your results.  What you eat, how often you exercise, and whether you smoke can also affect your lipid profile.  How do I prepare for the test?  You may need to not eat or drink anything but water for 12 to 14 hours before this test. In addition, be sure your healthcare provider knows about all medicines, herbs, vitamins, and supplements you are taking. This includes medicines that don't need a prescription and any illicit drugs you may use.  © 2166-0756 [employer] [address] [place] [address], [place] [address]. All rights reserved. This information is not intended as a substitute for professional medical care. Always follow your healthcare professional's instructions.            17 ga David/Air/27 ga Santiago

## 2020-07-12 NOTE — DISCHARGE NOTE OB - AVOID SEXUAL ACTIVITY UNTIL YOUR POSTPARTUM VISIT
Coronavirus (COVID-19) Notification    Your result for COVID-19 is Negative  Letter sent that will serve as a formal notice for your employer
Group A Streptococcus PCR is NEGATIVE  No treatment or change in treatment [facility] ED lab result protocol - Strep protocol.     
Patient reports several small, circular, itchy patches of rash to left forearm. Rash started 3 weeks ago with one [place] in [place] and has now spread. She now has two spots on the right forearm.   
Statement Selected

## 2020-07-13 NOTE — DISCHARGE NOTE OB - HOSPITAL COURSE
PAST SURGICAL HISTORY:  Bladder polyp S/p removal 4/4/2019 by Dr. Wylie    H/O cystoscopy TURP 7/27/2005 by Dr. Mendez    H/O laminectomy L3/L4 9/11/2002 by Dr. Jara    H/O partial nephrectomy 9/17/2009 by Dr. Vergara    H/O spinal fusion L4/5-L5/S1 6/23/2008 by Dr. 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 Prieto 10-20-19 @ 09:44    HPI:  35yo  @38w6d, ELIZABETH 10/25/19 by first trimester sonogram at 6 weeks, presents to L&D with contractions that started around 1400, every 5-10 minutes, 8/10 intensity. They were felt every 3-4 minutes two hours later, same intensity. She felt some LOF since the 0800 today, when she arrived in the hospital @2200 she felt a small gush of clear fluid. Denies VB. Reports good FM. Patient has received prenatal care from the beginning of gestation and denies any complications. (17 Oct 2019 22:44)      PAST MEDICAL & SURGICAL HISTORY:  No pertinent past medical history  No significant past surgical history      POST PARTUM COURSE: Postpartum course uncomplicated, to be discharged home on PPD#2.          LABS:                        10.7   13.80 )-----------( 223      ( 18 Oct 2019 16:49 )             32.7                    13.0   15.31 )-----------( 241      ( 17 Oct 2019 23:40 )             38.8           Allergies    No Known Allergies

## 2021-01-09 NOTE — PROCEDURAL SAFETY CHECKLIST WITH OR WITHOUT SEDATION - NSPOSTPXDISPO3SD_GEN_ALL_CORE
done
I personally performed the service described in the documentation recorded by the scribe in my presence, and it accurately and completely records my words and actions.

## 2021-05-18 NOTE — OB RN PATIENT PROFILE - NS_ADMITREASON_OBGYN_ALL_OB
Called pt. Informed her of order in place for screening mammogram and screening breast US. Informed her that referral for breast US is approved. Pt may call to schedule her appt. Phone number given for central scheduling. Pt will call back to schedule her annual exam once she has her breast imaging scheduled. Pt denies any questions.   
Order placed for mammogram and screening breast ultrasound. Awaiting referral approval for breast ultrasound.   
Labor at Term

## 2021-05-25 NOTE — OB PROVIDER H&P - PRESSURE ULCER(S)
Trial of Linzess 145 mcg po once a day (30 pills with 3 refills)   Keep up with fiber intake about 20 gm per day  Daily Bentyl 20 mg upto 4 times a day  Daily probiotics   Low FODMAP    no

## 2021-08-26 NOTE — OB RN PATIENT PROFILE - INSTRUCTED TO PATIENT: IF THE INFANT IS NOT PINK DURING SKIN TO SKIN, THE PARENTS IS TO SEEK ASSISTANCE IMMEDIATELY.
Concentration Of Solution Injected (Mg/Ml): 5.0 Consent: The risks of atrophy were reviewed with the patient. Include Z78.9 (Other Specified Conditions Influencing Health Status) As An Associated Diagnosis?: No Administered By (Optional): Milagros Jessica NP X Size Of Lesion In Cm (Optional): 0 Expiration Date (Optional): Aug 2022 Total Volume Injected (Ccs- Only Use Numbers And Decimals): 0.4 Lot # (Optional): [id number] Detail Level: Detailed Medical Necessity Clause: This procedure was medically necessary because the lesions that were treated were: Kenalog Preparation: Kenalog Validate Note Data When Using Inventory: Yes Statement Selected

## 2021-11-28 NOTE — OB RN PATIENT PROFILE - NO PERTINENT FAMILY HISTORY IN FIRST DEGREE RELATIVES OF:
Pt seen independently ambulating without difficulty around room talking c work supervisor at bedside, ERP notified and witness;   n/a

## 2022-05-18 NOTE — OB RN PATIENT PROFILE - PRO MENTAL HEALTH SX RECENT
pt brought by ems and police, under police custody, for blood pressure medications/ HTN. pt normally takes norvasc and lisinopril, which he has not had today due to being arrested. no sxs. no headache, dizziness, cp/sob, n/v.
none

## 2022-08-05 ENCOUNTER — NON-APPOINTMENT (OUTPATIENT)
Age: 37
End: 2022-08-05
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
## 2022-11-02 NOTE — PROCEDURAL SAFETY CHECKLIST WITH OR WITHOUT SEDATION - NSPTSPECIFCONCERNSD_GEN_ALL_CORE
Received request via: Pharmacy    Was the patient seen in the last year in this department? Yes    Does the patient have an active prescription (recently filled or refills available) for medication(s) requested? No    
no

## 2022-12-13 NOTE — OB RN PATIENT PROFILE - NS_PRENATALLABSOURCEGBS36_OBGYN_ALL_OB
V-Y Plasty Text: The defect edges were debeveled with a #15 scalpel blade.  Given the location of the defect, shape of the defect and the proximity to free margins an V-Y advancement flap was deemed most appropriate.  Using a sterile surgical marker, an appropriate advancement flap was drawn incorporating the defect and placing the expected incisions within the relaxed skin tension lines where possible.    The area thus outlined was incised deep to adipose tissue with a #15 scalpel blade.  The skin margins were undermined to an appropriate distance in all directions utilizing iris scissors. hard copy, drawn during this pregnancy

## 2023-03-15 NOTE — DISCHARGE NOTE OB - DO NOT DIET OR “STARVE” YOURSELF INTO GETTING BACK TO PRE-PREGNANCY SHAPE
Statement Selected
Bed in lowest position, wheels locked, appropriate side rails in place/Call bell, personal items and telephone in reach/Instruct patient to call for assistance before getting out of bed or chair/Non-slip footwear when patient is out of bed/[place] to call system/Physically safe environment - no spills, clutter or unnecessary equipment/Purposeful Proactive Rounding/Room/bathroom lighting operational, light cord in reach

## 2023-04-28 NOTE — OB RN PATIENT PROFILE - HBSAG: DATE, OB PROFILE
Encounter Date: 2023       History     Chief Complaint   Patient presents with    Abdominal Pain     ABD PAIN SINCE X2 DAYS  PT IS CURRENTLY 15 WEEKS PREGNANT; G2PMA1.    DESCRIBES THE PAIN AS A STRONG CRAMPING\  (-)DISCHARGE   (-)HEMORRHAGING      Jesus Christiansen is a 24 y.o. A1 female at 16 wks 1 day presents complaining of abdominal pain. Patient reports that she was walking her dog 2 days ago when her dog suddenly started running resulting in a mechanical fall onto the patient's left forearm and left hip. She denies head trauma or LOC. She states that a few hours later, she began to experiencing a cramping periumbilical pain that has been intermittently waxing and waning over the past 2 days. Patient is receiving prenatal care at [facility]. She denies fevers, nausea, vomiting, headache, vision changes, vaginal bleeding, abnormal discharge, loss of fluid, chest pain or SOB. She endorses chronic nausea and chronic lower back pain.     Review of patient's allergies indicates:  No Known Allergies  No past medical history on file.  No past surgical history on file.  Family History   Problem Relation Age of Onset    Breast cancer Neg Hx     Colon cancer Neg Hx     Ovarian cancer Neg Hx      Social History     Tobacco Use    Smoking status: Never    Smokeless tobacco: Never   Substance Use Topics    Alcohol use: Yes    Drug use: Yes     Frequency: 7.0 times per week     Types: Marijuana     Review of Systems   Constitutional:  Negative for fever.   Eyes:  Negative for visual disturbance.   Respiratory:  Negative for shortness of breath.    Cardiovascular:  Negative for chest pain.   Gastrointestinal:  Positive for abdominal pain (periumbilical x2 days) and nausea (chronic). Negative for vomiting.   Genitourinary:  Negative for difficulty urinating, dysuria, vaginal bleeding and vaginal discharge.   Musculoskeletal:  Positive for back pain (chronic lower back pain). Negative for gait problem.   Neurological:   Negative for numbness and headaches.     Physical Exam     Initial Vitals [04/27/23 2343]   BP Pulse Resp Temp SpO2   118/68 89 17 98.6 °F (37 °C) 99 %      MAP       --         Physical Exam    Nursing note and vitals reviewed.  Constitutional: She appears well-developed and well-nourished. She is not diaphoretic. No distress.   HENT:   Head: Normocephalic and atraumatic.   Mouth/Throat: Oropharynx is clear and moist.   Eyes: Conjunctivae and EOM are normal. Pupils are equal, round, and reactive to light.   Neck: Neck supple.   Normal range of motion.  Cardiovascular:  Normal rate, regular rhythm and normal heart sounds.           Pulmonary/Chest: No respiratory distress.   Abdominal: Abdomen is soft. She exhibits no distension. There is abdominal tenderness (mild perimbulical tenderness to palpation).   Musculoskeletal:         General: No edema. Normal range of motion.      Cervical back: Normal range of motion and neck supple.     Neurological: She is alert and oriented to person, place, and time. She has normal strength. No cranial nerve deficit or sensory deficit.   Skin: Skin is warm and dry. Capillary refill takes less than 2 seconds.       ED Course   Procedures  Labs Reviewed   POCT URINALYSIS, DIPSTICK OR TABLET REAGENT, AUTOMATED, WITH MICROSCOP          Imaging Results    None          Medications   acetaminophen tablet 650 mg (650 mg Oral Given 4/28/23 0047)     Medical Decision Making:   History:   Old Medical Records: I decided to obtain old medical records.  ED Management:  Contractions  - -152, patient reassured  - Tylenol given in ED  - Urine dipstick is within normal limits  - Patient stable for discharge at this time  - ED return precautions given  - She voiced understanding and is in agreement with the plan  - Follow-up with regular obstetrician recommended within 1 week          Attending Attestation:   Physician Attestation Statement for Resident:  As the supervising MD   Physician  Attestation Statement: I have personally seen and examined this patient.   I agree with the above history.  -:   As the supervising MD I agree with the above PE.     As the supervising MD I agree with the above treatment, course, plan, and disposition.   -: FHTs normal.  Tylenol given for pain with mild relief.  Pain c/w diastasis of pregnancy.  No LOF or VB.  Agree with discharge to home.  Will f/u with OB in 1 week.  I was personally present during the critical portions of the procedure(s) performed by the resident and was immediately available in the ED to provide services and assistance as needed during the entire procedure.   I have reviewed the following: old records at this facility.                           Clinical Impression:   Final diagnoses:  [R10.33] Periumbilical abdominal pain (Primary)  [Z3A.16] 16 weeks gestation of pregnancy        ED Disposition Condition    Discharge Stable          ED Prescriptions    None       Follow-up Information    None          Nely Rosa MD  Resident  04/28/23 0051       Nahomy Bravo MD  05/02/23 [id number]     20-Oct-2017

## 2024-04-17 NOTE — OB RN DELIVERY SUMMARY - NS_TRUEKNOTA_OBGYN_ALL_OB
Pharmacist Admission Medication History    Admission medication history is complete. The information provided in this note is only as accurate as the sources available at the time of the update.    Information Source(s): Patient and CareEverywhere/SureScripts via in-person    Pertinent Information: -    Changes made to PTA medication list:  Added: all meds  Deleted: None  Changed: None    Allergies reviewed with patient and updates made in EHR: yes    Medication History Completed By: Kavon Gillespie [facility] 4/16/2024 9:29 PM    PTA Med List   Medication Sig Last Dose    albuterol (PROAIR HFA/PROVENTIL HFA/VENTOLIN HFA) 108 (90 Base) MCG/ACT inhaler Inhale 1-2 puffs into the lungs every 4 hours as needed for wheezing Unknown at PRN    dibucaine 1 % OINT rectal ointment Apply externally 3 -4 times daily as needed for pain Past Week    hydrocortisone (ANUSOL-HC) 25 MG suppository Insert 1 Suppository (25 mg) rectally two times daily as needed (for rectal pain or bleeding or itching) for up to 14 days. 4/16/2024 at am    hydrocortisone, Perianal, (ANUSOL-HC) 2.5 % cream Apply externally twice daily for 14 days 4/16/2024 at am     
None

## 2024-05-10 NOTE — DISCHARGE NOTE OB - AFTER URINATION AND/OR BOWEL MOVEMENT, CLEAN WITH WARM WATER USING A PERI- BOTTLE, THEN PAT DRY WITH TOILET TISSUE
Refill Request    Medication request: HYDROcodone-acetaminophen 5-325 MG   TAKE ONE TABLET BY MOUTH TWICE A DAY AS NEEDED FOR PAIN     LOV: 2024 Serafin Torres MD     NOV: 2024 Serafin Torres MD      [facility]/Last refill: 2024  #30    UDS: (if applicable): none  Pain contract: - last signed on 10/3/22.    LOV plan (if weaning or changing medications): no mention     
Statement Selected

## 2024-09-03 NOTE — OB RN PATIENT PROFILE - DATE AND TIME PROVIDER WAS NOTIFIED
[FreeTextEntry1] : General: No acute distress. Well nourished and well kempt.\par  Head: AT/NC\par  Eyes: PERRL. EOMI. No scleral icterus\par  Pulmonary: No respiratory distress. Clear to auscultation bilaterally. No wheezes, rales, or rhonchi. \par  CV: Regular rate and rhythm. Normal S1, S2. No murmurs. No chest wall tenderness. Distal pulses intact. Good capillary refill.\par  ABD: Soft. NT/ND. No rebound, no guarding, no rigidity. No peritoneal signs. No masses.  \par  Extremities: Warm. No edema. 2+ pulses.\par  Neurological: A&O x 4.\par  Psychiatric: Normal affect and mood.\par  \par   28-May-2018

## 2025-05-07 NOTE — OB RN PATIENT PROFILE - THE IMPORTANCE OF THE NEWBORN'S COMFORT AND THERMOREGULATION DURING SKIN TO SKIN: ANY PART OF INFANT SKIN NOT TOUCHING PARENT'S SKIN IS TO BE COVERED BY A BLANKET.
Problem: Skin Integrity Alteration  Goal: Skin remains intact with no new/deterioration of wound or pressure injury  Outcome: Monitoring/Evaluating progress  Goal: Participates in wound care activities  Outcome: Monitoring/Evaluating progress    Wound RN follow up Visit (30 minutes)    Reason for visit: Wound Care RN Follow-Up    Wound background: Bilateral Ear PIs    Assessment: See Epic Wound Flowsheet    Continue current plan of care, wound care will continue to follow.    Specialty interventions in place include: Isoflex mattress with pump and heel protector boots.     Plan discussed with bedside nurse.    This patient was seen by myself and Dipika LION.    Images linked and uploaded into Findline.     Please page [phone] for questions Monday-Friday 7:00-17:00, Saturday/Sunday 7:00-12:00.       Statement Selected

## 2025-07-09 NOTE — OB RN PATIENT PROFILE - BILL OF RIGHTS/ADMISSION INFORMATION PROVIDED TO:
Clair is a 35 Radha Haile MD  [facility]      Subjective:   Clair Kulkarni is a 35 Abbie  Signed Electronically by: Radha Haile MD     Patient
